# Patient Record
Sex: MALE | Race: WHITE
[De-identification: names, ages, dates, MRNs, and addresses within clinical notes are randomized per-mention and may not be internally consistent; named-entity substitution may affect disease eponyms.]

---

## 2019-02-26 ENCOUNTER — HOSPITAL ENCOUNTER (EMERGENCY)
Dept: HOSPITAL 62 - ER | Age: 39
Discharge: HOME | End: 2019-02-26
Payer: SELF-PAY

## 2019-02-26 VITALS — SYSTOLIC BLOOD PRESSURE: 135 MMHG | DIASTOLIC BLOOD PRESSURE: 84 MMHG

## 2019-02-26 DIAGNOSIS — S05.02XA: Primary | ICD-10-CM

## 2019-02-26 DIAGNOSIS — H53.8: ICD-10-CM

## 2019-02-26 DIAGNOSIS — F17.210: ICD-10-CM
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
## 2019-02-26 DIAGNOSIS — H57.12: ICD-10-CM

## 2019-02-26 PROCEDURE — 99283 EMERGENCY DEPT VISIT LOW MDM: CPT

## 2019-02-26 NOTE — ER DOCUMENT REPORT
ED General





- General


Chief Complaint: Eye Pain


Stated Complaint: EYE PAIN


Time Seen by Provider: 02/26/19 07:52


Primary Care Provider: 


TIFFANY BAEZ MD [ACTIVE STAFF] - Follow up tomorrow (call for appointment today.

)


Notes: 





Patient is a 38-year-old male that presents to the emergency department for 

chief complaint of left eye redness and pain.  Patient states that Saturday 

evening he was playing video games, that he felt like something got into his 

eye, he tried to flush it out and rub his eye, but it only got worse.  Now his 

entire eye feels sore, he has had some blurred vision, but no loss of vision.  

Denies having any pain with extraocular eye movements.  Denies any fevers, 

chills, or drainage to the eye.  He is otherwise healthy, nondiabetic.  He 

currently rates his pain as a 4 out of 10 describes as an ache and itching 

sensation in his left eye.





Past Medical History: Denies chronic conditions


Past Surgical History: Denies major surgical history


Social History: Admits to smoking cigarettes daily, rare alcohol use, denies 

illicit drug use. 


Family History: Reviewed and noncontributory for presenting illness


Allergies: Reviewed, see documented allergy list. 





REVIEW OF SYSTEMS:


Other than noted above, the 12 point review of systems was reviewed with the 

patient and were negative, all pertinent findings are included in the HPI.





PHYSICAL EXAMINATION:





Vital signs reviewed, nursing noted reviewed. 





GENERAL: Well-appearing, well-nourished and in no acute distress.





HEAD: Atraumatic, normocephalic.





EYES: Left eye is moderately injected, no visible foreign body with white light,

EOMI, PERRLA.  On slit-lamp exam, and fluorescein dye exam, there was a linear 

corneal abrasion at the 6 o'clock position with uptake of dye, no foreign body 

appreciated on slit-lamp exam.  The right eye was unremarkable.  Bilateral 

intraocular pressures were obtained, averaged of 15 on the right, and 11 on the 

left





ENT:  Moist mucous membranes.





NECK: Normal range of motion, supple without lymphadenopathy





LUNGS: Breath sounds clear to auscultation bilaterally and equal.  No wheezes 

rales or rhonchi.





HEART: Regular rate and rhythm without murmurs





EXTREMITIES: Nontender, good range of motion, no pitting or edema.  





NEUROLOGICAL: No focal neurological deficits. Moves all extremities 

spontaneously Motor and sensory grossly intact on exam.





PSYCH: Normal mood, normal affect.





SKIN: Warm, Dry, normal turgor, no rashes or lesions noted on exposed skin





TRAVEL OUTSIDE OF THE [place] IN LAST 30 DAYS: No





- Related Data


Allergies/Adverse Reactions: 


                                        





No Known Allergies Allergy (Unverified 02/26/19 07:15)


   











Past Medical History





- Social History


Smoking Status: Current Every Day Smoker


Chew tobacco use (# tins/day): No


Frequency of alcohol use: None


Drug Abuse: Methamphetamine


Family History: Reviewed & Not Pertinent


Patient has suicidal ideation: No


Patient has homicidal ideation: No


Renal/ Medical History: Denies: Hx Peritoneal Dialysis





Physical Exam





- Vital signs


Vitals: 


                                        











Temp Pulse Resp BP Pulse Ox


 


 97.5 F   66   16   135/84 H  100 


 


 02/26/19 07:16  02/26/19 07:16  02/26/19 07:16  02/26/19 07:16  02/26/19 07:16














Course





- Re-evaluation


Re-evalutation: 





Patient seen and examined, vital signs reviewed, on my exam, with evidence of 

eye exam, his pressures were normal, there is no foreign body, there was 

evidence of a linear corneal abrasion at the 6 o'clock position, over the iris, 

and not the pupil.  His eye exam was otherwise unremarkable, the rest of his 

exam was unremarkable.  We will treat the patient with Polytrim eyedrops, and 

have him follow-up with ophthalmology.








- Vital Signs


Vital signs: 


                                        











Temp Pulse Resp BP Pulse Ox


 


 97.5 F   66   16   135/84 H  100 


 


 02/26/19 07:16  02/26/19 07:16  02/26/19 07:16  02/26/19 07:16  02/26/19 07:16














Discharge





- Discharge


Clinical Impression: 


Corneal abrasion, left


Qualifiers:


 Encounter type: initial encounter Qualified Code(s): S05.02XA - Injury of 

conjunctiva and corneal abrasion without foreign body, left eye, initial 

encounter





Condition: Stable


Disposition: HOME, SELF-CARE


Instructions:  Corneal Abrasion ([facility])


Additional Instructions: 


Please use the eyedrops as directed for the next 5 days and follow-up with an 

ophthalmologist.


Prescriptions: 


Polymyxin B Sulfate/Tmp [Polytrim Oph Soln 10 ml] 1 drop OS Q4H 5 Days #1 bottle


Referrals: 


TIFFANY BAEZ MD [ACTIVE STAFF] - Follow up tomorrow (call for appointment today.

)